# Patient Record
(demographics unavailable — no encounter records)

---

## 2025-07-07 NOTE — DISCUSSION/SUMMARY
[Medication Risks Reviewed] : Medication risks reviewed [Surgical risks reviewed] : Surgical risks reviewed [de-identified] : 50F w/ mild R knee OA (medial compartment) and possible chronic medial meniscus tear (confirmed by MRI per patient) s/p twisting injury in Oct 2023. Natural hx of OA along w/ MM tear d/w patient including non op and op interventions including UKA and TKA. she is interested in pursuing joint preservation surgical options so we will refer them to sports medicine surgeons (Dr Padron and Mary name provided). we also will provide PT Rx and meloxicam 15 QD Rx as well and she can f/u with us if she is interested in arthroplasty surgical optioins (or sooner if any new issues/concerns arise.   I, Dr. Kearns, personally performed the evaluation and management (E/M) services for this patient. That E/M includes conducting the clinically appropriate initial history &/or exam, assessing all conditions, and establishing the plan of care. Today, resident physician, Fredi Luna, was here to observe my evaluation and management service for this patient & follow plan of care established by me going forward.

## 2025-07-07 NOTE — PHYSICAL EXAM
[de-identified] : general: NAD, A&O x 3  R knee exam  no evidence of surgical incisions  mild suprapatellar effusions R knee ROM 0 - 120  mild medial joint line tenderness, no lateral joint line tenderness, mild discomfort w/ jana's   ACL/PCL intact  collaterals intact  2+ DP  [de-identified] : xr 3 views of r knee obtained today for r knee pain demonstrates mild KL grade 1 medial compartment OA, otherwise joint space well preserved

## 2025-07-07 NOTE — REVIEW OF SYSTEMS
[Arthralgia] : arthralgia [Joint Pain] : joint pain [Joint Stiffness] : no joint stiffness [Joint Swelling] : joint swelling [Fever] : no fever [Chills] : no chills

## 2025-07-07 NOTE — HISTORY OF PRESENT ILLNESS
[de-identified] : 50F presenting with R knee pain, states that pain really started in october 2023 when she twisted her r knee in a bus. she saw [facility]SH approx 1 yr ago who dx her w/ medial meniscus tear in R knee and prescribed PT. she did 6 intermittent sessions and said that PT did help w/ pain but pain worsened in winter. she does not have MRI report w/ her. she does not take anything for pain. she localizes pain to medial aspect of knee only. she does not use assistive device.

## 2025-07-07 NOTE — HISTORY OF PRESENT ILLNESS
[de-identified] : 50F presenting with R knee pain, states that pain really started in october 2023 when she twisted her r knee in a bus. she saw [facility]SH approx 1 yr ago who dx her w/ medial meniscus tear in R knee and prescribed PT. she did 6 intermittent sessions and said that PT did help w/ pain but pain worsened in winter. she does not have MRI report w/ her. she does not take anything for pain. she localizes pain to medial aspect of knee only. she does not use assistive device.

## 2025-07-07 NOTE — DISCUSSION/SUMMARY
[Medication Risks Reviewed] : Medication risks reviewed [Surgical risks reviewed] : Surgical risks reviewed [de-identified] : 50F w/ mild R knee OA (medial compartment) and possible chronic medial meniscus tear (confirmed by MRI per patient) s/p twisting injury in Oct 2023. Natural hx of OA along w/ MM tear d/w patient including non op and op interventions including UKA and TKA. she is interested in pursuing joint preservation surgical options so we will refer them to sports medicine surgeons (Dr Padron and Mary name provided). we also will provide PT Rx and meloxicam 15 QD Rx as well and she can f/u with us if she is interested in arthroplasty surgical optioins (or sooner if any new issues/concerns arise.   I, Dr. Kearns, personally performed the evaluation and management (E/M) services for this patient. That E/M includes conducting the clinically appropriate initial history &/or exam, assessing all conditions, and establishing the plan of care. Today, resident physician, Fredi Luna, was here to observe my evaluation and management service for this patient & follow plan of care established by me going forward.

## 2025-07-07 NOTE — PHYSICAL EXAM
[de-identified] : general: NAD, A&O x 3  R knee exam  no evidence of surgical incisions  mild suprapatellar effusions R knee ROM 0 - 120  mild medial joint line tenderness, no lateral joint line tenderness, mild discomfort w/ jana's   ACL/PCL intact  collaterals intact  2+ DP  [de-identified] : xr 3 views of r knee obtained today for r knee pain demonstrates mild KL grade 1 medial compartment OA, otherwise joint space well preserved